# Patient Record
Sex: FEMALE | Race: WHITE
[De-identification: names, ages, dates, MRNs, and addresses within clinical notes are randomized per-mention and may not be internally consistent; named-entity substitution may affect disease eponyms.]

---

## 2017-11-12 ENCOUNTER — HOSPITAL ENCOUNTER (OUTPATIENT)
Dept: HOSPITAL 10 - OBT | Age: 22
LOS: 1 days | Discharge: HOME | End: 2017-11-13
Attending: OBSTETRICS & GYNECOLOGY
Payer: COMMERCIAL

## 2017-11-12 VITALS
BODY MASS INDEX: 31.94 KG/M2 | HEIGHT: 60 IN | BODY MASS INDEX: 31.94 KG/M2 | WEIGHT: 162.7 LBS | HEIGHT: 60 IN | WEIGHT: 162.7 LBS

## 2017-11-12 DIAGNOSIS — O26.892: Primary | ICD-10-CM

## 2017-11-12 DIAGNOSIS — Z3A.21: ICD-10-CM

## 2017-11-12 DIAGNOSIS — R19.7: ICD-10-CM

## 2017-11-12 DIAGNOSIS — R11.2: ICD-10-CM

## 2017-11-12 LAB
ADD UMIC: NO
ALBUMIN SERPL-MCNC: 3.3 G/DL (ref 3.3–4.9)
ALBUMIN/GLOB SERPL: 0.97 {RATIO}
ALP SERPL-CCNC: 116 IU/L (ref 42–121)
ALT SERPL-CCNC: 24 IU/L (ref 13–69)
ANION GAP SERPL CALC-SCNC: 13 MMOL/L (ref 8–16)
AST SERPL-CCNC: 14 IU/L (ref 15–46)
BACTERIA #/AREA URNS HPF: (no result) /HPF
BASOPHILS # BLD AUTO: 0 10^3/UL (ref 0–0.1)
BASOPHILS NFR BLD: 0.2 % (ref 0–2)
BILIRUB DIRECT SERPL-MCNC: 0 MG/DL (ref 0–0.2)
BILIRUB SERPL-MCNC: 0 MG/DL (ref 0.2–1.3)
BUN SERPL-MCNC: 10 MG/DL (ref 7–20)
CALCIUM SERPL-MCNC: 8.4 MG/DL (ref 8.4–10.2)
CHLORIDE SERPL-SCNC: 109 MMOL/L (ref 97–110)
CO2 SERPL-SCNC: 19 MMOL/L (ref 21–31)
COLOR UR: YELLOW
CREAT SERPL-MCNC: 0.51 MG/DL (ref 0.44–1)
EOSINOPHIL # BLD: 0.2 10^3/UL (ref 0–0.5)
EOSINOPHIL NFR BLD: 2 % (ref 0–7)
ERYTHROCYTE [DISTWIDTH] IN BLOOD BY AUTOMATED COUNT: 13.9 % (ref 11.5–14.5)
GLOBULIN SER-MCNC: 3.4 G/DL (ref 1.3–3.2)
GLUCOSE SERPL-MCNC: 96 MG/DL (ref 70–220)
GLUCOSE UR STRIP-MCNC: NEGATIVE MG/DL
HCT VFR BLD CALC: 34.9 % (ref 37–47)
HGB BLD-MCNC: 11.4 G/DL (ref 12–16)
KETONES UR STRIP.AUTO-MCNC: NEGATIVE MG/DL
LYMPHOCYTES # BLD AUTO: 3 10^3/UL (ref 0.8–2.9)
LYMPHOCYTES NFR BLD AUTO: 29 % (ref 15–51)
MCH RBC QN AUTO: 28.9 PG (ref 29–33)
MCHC RBC AUTO-ENTMCNC: 32.7 G/DL (ref 32–37)
MCV RBC AUTO: 88.4 FL (ref 82–101)
MONOCYTES # BLD: 0.6 10^3/UL (ref 0.3–0.9)
MONOCYTES NFR BLD: 5.2 % (ref 0–11)
NEUTROPHILS # BLD: 6.6 10^3/UL (ref 1.6–7.5)
NEUTROPHILS NFR BLD AUTO: 63.2 % (ref 39–77)
NITRITE UR QL STRIP.AUTO: NEGATIVE MG/DL
NRBC # BLD MANUAL: 0 10^3/UL (ref 0–0)
NRBC BLD AUTO-RTO: 0 /100WBC (ref 0–0)
PLATELET # BLD: 369 10^3/UL (ref 140–415)
PMV BLD AUTO: 9.8 FL (ref 7.4–10.4)
POTASSIUM SERPL-SCNC: 3.8 MMOL/L (ref 3.5–5.1)
PROT SERPL-MCNC: 6.7 G/DL (ref 6.1–8.1)
RBC # BLD AUTO: 3.95 10^6/UL (ref 4.2–5.4)
RBC # UR AUTO: NEGATIVE MG/DL
SODIUM SERPL-SCNC: 137 MMOL/L (ref 135–144)
SQUAMOUS #/AREA URNS HPF: (no result) /HPF
UR ASCORBIC ACID: NEGATIVE MG/DL
UR BILIRUBIN (DIP): NEGATIVE MG/DL
UR CLARITY: (no result)
UR PH (DIP): 5 (ref 5–9)
UR RBC: 1 /HPF (ref 0–5)
UR SPECIFIC GRAVITY (DIP): 1.01 (ref 1–1.03)
UR TOTAL PROTEIN (DIP): NEGATIVE MG/DL
UROBILINOGEN UR STRIP-ACNC: NEGATIVE MG/DL
WBC # BLD AUTO: 10.5 10^3/UL (ref 4.8–10.8)
WBC # UR STRIP: NEGATIVE LEU/UL

## 2017-11-12 PROCEDURE — 96375 TX/PRO/DX INJ NEW DRUG ADDON: CPT

## 2017-11-12 PROCEDURE — G0463 HOSPITAL OUTPT CLINIC VISIT: HCPCS

## 2017-11-12 PROCEDURE — 80053 COMPREHEN METABOLIC PANEL: CPT

## 2017-11-12 PROCEDURE — 36415 COLL VENOUS BLD VENIPUNCTURE: CPT

## 2017-11-12 PROCEDURE — 81003 URINALYSIS AUTO W/O SCOPE: CPT

## 2017-11-12 PROCEDURE — 96360 HYDRATION IV INFUSION INIT: CPT

## 2017-11-12 PROCEDURE — 81001 URINALYSIS AUTO W/SCOPE: CPT

## 2017-11-12 PROCEDURE — 85025 COMPLETE CBC W/AUTO DIFF WBC: CPT

## 2017-11-13 NOTE — PN
Triage Information


Date/Time


2017.


Reason for visit:  Nausea/vomiting/diarrhea


Weeks of Gestation


21w


/Para


3/2


Diabetes:  none


Hypertention:  none


Additional information


Pt had sx's for 3 hours prior to admission. Denies fevers/chills. Pt was in 

[facility] and just returned so has not had prenatal care yet.


POBHx:  x 1.  x 1.


PMHx: none.


PSHx:  x 1.





Objective


BP 96/59  T= 98.1


Fetal Heart Rate:  150's


Contractions:  None


Exam


Deferred.





Results/Medications


Result Diagram:  


17





Results 24 hrs





Laboratory Tests








Test


  17


21:05 17


21:50


 


Urine Color YELLOW   


 


Urine Clarity


  SLIGHTLY


CLOUDY  A 


 


 


Urine pH 5.0   


 


Urine Specific Gravity 1.010   


 


Urine Ketones NEGATIVE   


 


Urine Nitrite NEGATIVE   


 


Urine Bilirubin NEGATIVE   


 


Urine Urobilinogen NEGATIVE   


 


Urine Leukocyte Esterase NEGATIVE   


 


Urine Microscopic RBC 1   


 


Urine Microscopic WBC 3   


 


Urine Squamous Epithelial


Cells FEW  


  


 


 


Urine Bacteria FEW  A 


 


Urine Hemoglobin NEGATIVE   


 


Urine Glucose NEGATIVE   


 


Urine Total Protein NEGATIVE   


 


White Blood Count  10.5  #


 


Red Blood Count  3.95  L


 


Hemoglobin  11.4  L


 


Hematocrit  34.9  L


 


Mean Corpuscular Volume  88.4  


 


Mean Corpuscular Hemoglobin  28.9  L


 


Mean Corpuscular Hemoglobin


Concent 


  32.7  


 


 


Red Cell Distribution Width  13.9  


 


Platelet Count  369  


 


Mean Platelet Volume  9.8  


 


Neutrophils %  63.2  


 


Lymphocytes %  29.0  


 


Monocytes %  5.2  


 


Eosinophils %  2.0  


 


Basophils %  0.2  


 


Nucleated Red Blood Cells %  0.0  


 


Neutrophils #  6.6  


 


Lymphocytes #  3.0  H


 


Monocytes #  0.6  


 


Eosinophils #  0.2  


 


Basophils #  0.0  


 


Nucleated Red Blood Cells #  0.0  


 


Sodium Level  137  


 


Potassium Level  3.8  


 


Chloride Level  109  


 


Carbon Dioxide Level  19  L


 


Anion Gap  13  


 


Blood Urea Nitrogen  10  


 


Creatinine  0.51  


 


Glucose Level  96  


 


Calcium Level  8.4  


 


Total Bilirubin  0.0  L


 


Direct Bilirubin  0.00  


 


Indirect Bilirubin  0.0  


 


Aspartate Amino Transf


(AST/SGOT) 


  14  L


 


 


Alanine Aminotransferase


(ALT/SGPT) 


  24  


 


 


Alkaline Phosphatase  116  


 


Total Protein  6.7  


 


Albumin  3.3  


 


Globulin  3.40  H


 


Albumin/Globulin Ratio  0.97  








Medications





 Current Medications


Lactated Ringer's (Lr) 1,000 ml @  125 mls/hr Q8H IV  Last administered on t 22:08; Admin Dose 125 MLS/HR;  Start 17 at 21:34


Disposition:  Discharge


Assessment/Plan


A: IUP at 21 weeks.


GI distress.


P: After IV hydration and one dose of Zofran the pt had no vomiting or diarrhea 

the entire 3 hours that she was here and reports feeling much better. All labs 

were normal.


Pt encouraged to seek [facility] as soon as possible.


Pt d/c'ed home.











PEYTON FAIR MD 2017 00:02

## 2017-11-13 NOTE — TRIAGE
===================================

OB Triage

===================================

Datetime Report Generated by CPN: 11/13/2017 00:24

   

   

===========================

Datetime: 11/13/2017 00:02

===========================

   

 Stage of Pregnancy:  OB Triage

   

===========================

Datetime: 11/12/2017 23:55

===========================

   

   

===================================

Fetal Heart Rate

===================================

   

 FHR Baseline Rate:  150

   

===========================

Datetime: 11/12/2017 23:00

===========================

   

   

===================================

Labor Evaluation

===================================

   

 Frequency:  0

 Duration (sec)2399:  0

   

===========================

Datetime: 11/12/2017 22:32

===========================

   

   

===================================

Pain Assessment

===================================

   

 Pain Scale:  0

 Pain Assessment Comments:  PT. STATES SHE IS NO LONGER HAVING CRAMPING PAIN OR URGE FOR DIARRHEA, N
AUSEA OR VOMITING

   

===========================

Datetime: 11/12/2017 21:43

===========================

   

   

===================================

Fetal Heart Rate

===================================

   

 FHR Baseline Rate:  155

 FHR Baseline Changes:  No Baseline Change

 Comments:  FHT OBTAINED ONLY D/T GA

   

===========================

Datetime: 11/12/2017 21:40

===========================

   

 Monitor Mode:  External

 Contraction Comments:  TOCO PLACED SLIGHTLY BELOW UMBILICUS

 Monitor Mode:  External US

   

===========================

Datetime: 11/12/2017 21:29

===========================

   

 Stage of Pregnancy:  OB Triage

 Assessment Type:  Triage

 Time of Arrival:  11/12/2017 21:11

 EGA:  21.3

 Arrived By:  Wheelchair

 Arrived From:  Home

 Chief Complaint:  N/V AND DIARRHEA X5

 Rupture of Membranes:  Denies

 Vaginal Bleeding:  None

 Vaginal Discharge:  Denies

 Recent Sexual Intercouse:  Denies

 Abdominal Trauma:  Not Applicable

 Patient Complaints:  Cramping; Nausea; Vomiting; Other

 Time Provider Notified:  11/12/2017 21:27

 Provider Notified:  REICHE

 Initial Plan:  DOPPLER, CALL OB

   

===================================

Maternal Assessment

===================================

   

 Level of Consciousness:  Fully Conscious

 Headache:  Denies

 Blurred Vision:  No

 Respiratory Effort:  Unlabored; Regular Rhythm; Equal Expansion

 Nausea/Vomiting:  Denies

 RUQ Epigastric Pain:  Denies

 Facial Edema:  None

   

===================================

Fall Risk Assessment

===================================

   

 History of Falling:  (0) No

 Secondary Diagnosis:  (0) No

 Ambulatory Aid:  (0) Bedrest/Nurse Assist

 IV Therapy:  (0) No

 Gait:  (0) Normal/Bedrest/Immobile

 Mental Status:  (0) Oriented to Own Ability

 Fall Score:  0

 Fall Risk Score Definition:  No Risk: No action required

   

===========================

Datetime: 11/12/2017 21:27

===========================

   

 Stage of Pregnancy:  OB Triage

## 2017-12-07 ENCOUNTER — HOSPITAL ENCOUNTER (OUTPATIENT)
Dept: HOSPITAL 10 - OBT | Age: 22
Discharge: HOME | End: 2017-12-07
Attending: OBSTETRICS & GYNECOLOGY
Payer: COMMERCIAL

## 2017-12-07 VITALS — DIASTOLIC BLOOD PRESSURE: 55 MMHG | RESPIRATION RATE: 18 BRPM | HEART RATE: 78 BPM | SYSTOLIC BLOOD PRESSURE: 96 MMHG

## 2017-12-07 VITALS
BODY MASS INDEX: 30.91 KG/M2 | HEIGHT: 62 IN | HEIGHT: 62 IN | BODY MASS INDEX: 30.91 KG/M2 | WEIGHT: 167.99 LBS | WEIGHT: 167.99 LBS

## 2017-12-07 DIAGNOSIS — Z3A.31: ICD-10-CM

## 2017-12-07 LAB
ADD UMIC: NO
BACTERIA #/AREA URNS HPF: (no result) /HPF
BASOPHILS # BLD AUTO: 0 10^3/UL (ref 0–0.1)
BASOPHILS NFR BLD: 0.4 % (ref 0–2)
COLOR UR: YELLOW
EOSINOPHIL # BLD: 0.1 10^3/UL (ref 0–0.5)
EOSINOPHIL NFR BLD: 0.6 % (ref 0–7)
ERYTHROCYTE [DISTWIDTH] IN BLOOD BY AUTOMATED COUNT: 13.4 % (ref 11.5–14.5)
GLUCOSE UR STRIP-MCNC: NEGATIVE MG/DL
HCT VFR BLD CALC: 34.3 % (ref 37–47)
HGB BLD-MCNC: 11.3 G/DL (ref 12–16)
KETONES UR STRIP.AUTO-MCNC: NEGATIVE MG/DL
LYMPHOCYTES # BLD AUTO: 2.6 10^3/UL (ref 0.8–2.9)
LYMPHOCYTES NFR BLD AUTO: 28.1 % (ref 15–51)
MCH RBC QN AUTO: 28.7 PG (ref 29–33)
MCHC RBC AUTO-ENTMCNC: 32.9 G/DL (ref 32–37)
MCV RBC AUTO: 87.1 FL (ref 82–101)
MONOCYTES # BLD: 0.4 10^3/UL (ref 0.3–0.9)
MONOCYTES NFR BLD: 4.2 % (ref 0–11)
NEUTROPHILS # BLD: 6.2 10^3/UL (ref 1.6–7.5)
NEUTROPHILS NFR BLD AUTO: 66.4 % (ref 39–77)
NITRITE UR QL STRIP.AUTO: NEGATIVE MG/DL
NRBC # BLD MANUAL: 0 10^3/UL (ref 0–0)
NRBC BLD AUTO-RTO: 0 /100WBC (ref 0–0)
PLATELET # BLD: 384 10^3/UL (ref 140–415)
PMV BLD AUTO: 9.6 FL (ref 7.4–10.4)
RBC # BLD AUTO: 3.94 10^6/UL (ref 4.2–5.4)
RBC # UR AUTO: NEGATIVE MG/DL
SQUAMOUS #/AREA URNS HPF: (no result) /HPF
UR ASCORBIC ACID: NEGATIVE MG/DL
UR BILIRUBIN (DIP): NEGATIVE MG/DL
UR CLARITY: (no result)
UR PH (DIP): 6 (ref 5–9)
UR RBC: 1 /HPF (ref 0–5)
UR SPECIFIC GRAVITY (DIP): 1.02 (ref 1–1.03)
UR TOTAL PROTEIN (DIP): NEGATIVE MG/DL
UROBILINOGEN UR STRIP-ACNC: NEGATIVE MG/DL
WBC # BLD AUTO: 9.3 10^3/UL (ref 4.8–10.8)
WBC # UR STRIP: NEGATIVE LEU/UL

## 2017-12-07 PROCEDURE — 85025 COMPLETE CBC W/AUTO DIFF WBC: CPT

## 2017-12-07 PROCEDURE — 76818 FETAL BIOPHYS PROFILE W/NST: CPT

## 2017-12-07 PROCEDURE — 96372 THER/PROPH/DIAG INJ SC/IM: CPT

## 2017-12-07 PROCEDURE — 76705 ECHO EXAM OF ABDOMEN: CPT

## 2017-12-07 PROCEDURE — 81001 URINALYSIS AUTO W/SCOPE: CPT

## 2017-12-07 PROCEDURE — 81003 URINALYSIS AUTO W/O SCOPE: CPT

## 2017-12-07 PROCEDURE — 76817 TRANSVAGINAL US OBSTETRIC: CPT

## 2017-12-07 PROCEDURE — G0463 HOSPITAL OUTPT CLINIC VISIT: HCPCS

## 2017-12-07 NOTE — RADRPT
PROCEDURE:   OB ultrasound for biophysical profile 

 

CLINICAL INDICATION:   Biophysical profile. 

 

TECHNIQUE:   Multiple sonographic images of the pelvis were obtained.  Transabdominal view of the gr
avid uterus are available for review.  The images were reviewed on a PACS workstation.  

 

COMPARISON:   None 

 

FINDINGS:

 

Fetal breathing movement = 2/2

Fetal tone = 2/2

Fetal motion = 2/2

Quantitative amniotic fluid volume = 2/2

 

FANY = 14.0 cm

Single live intrauterine pregnancy with fetal cardiac activity at 150 beats per minute.  

There is a fundal placenta without previa or abruption.

Cervix is closed and measures 4.6 cm in length.

 

IMPRESSION:

 

1.  Single living intrauterine gestation in breech position.  

2.  Biophysical profile = 8/8.

3.  FANY = 14.0 cm. 

 

 

RPTAT: AACC

_____________________________________________ 

Physician Isela           Date    Time 

Electronically viewed and signed by Physician Isela on 12/07/2017 17:07 

 

D:  12/07/2017 17:07  T:  12/07/2017 17:07

/

## 2017-12-08 NOTE — RADRPT
PROCEDURE:   US Abdomen limited. 

 

CLINICAL INDICATION:    Abdominal pain  

 

TECHNIQUE:   Multiple real-time images were acquired of the patient's right lower quadrant utilizing
 a high resolution transducer. 

 

COMPARISON:   None 

 

FINDINGS:

The appendix is not visualized.  

No free fluid is identified. No pain was elicited with compression or release of the ultrasound prob
e.

 

 

IMPRESSION:

 

No ultrasound evidence of appendicitis.

If there is a high clinical suspicion for appendicitis, cross-sectional imaging is recommended.

 

 

RPTAT: HJES

_____________________________________________ 

.Roel Rush MD, MD           Date    Time 

Electronically viewed and signed by .Roel Rush MD, MD on 12/07/2017 18:38 

 

D:  12/07/2017 18:38  T:  12/07/2017 18:38

.S/

## 2017-12-08 NOTE — TRIAGE
===================================

OB Triage

===================================

Datetime Report Generated by CPN: 12/08/2017 05:38

   

   

===========================

Datetime: 12/07/2017 18:12

===========================

   

   

===================================

Labor Evaluation

===================================

   

 Frequency:  0

 Monitor Mode:  External

 Resting Tone [place]:  Relaxed

   

===================================

Fetal Heart Rate

===================================

   

 FHR Baseline Rate:  155

 Monitor Mode:  External US

 Variability:  Moderate 6-25 bpm

 Decelerations:  None

 Category:  Category I

   

===================================

Pain Assessment

===================================

   

 Pain Scale:  4

 Pain Presence:  Intermittent

 Pain Type:  Cramping

 Pain Location:  Perineum

 Pain Goal:  3

 Pain Relief Measures:  Comfort Measures

   

===========================

Datetime: 12/07/2017 18:04

===========================

   

 Stage of Pregnancy:  OB Triage

   

===========================

Datetime: 12/07/2017 17:11

===========================

   

   

===================================

Labor Evaluation

===================================

   

 Frequency:  0

 Monitor Mode:  External

 Pattern:  Normal: <= 5 Contractions in 10 Minutes

 Resting Tone [place]:  Relaxed

   

===================================

Fetal Heart Rate

===================================

   

 FHR Baseline Rate:  155

 Monitor Mode:  External US

 Variability:  Moderate 6-25 bpm

 Accelerations:  10X10

 Decelerations:  None

 Category:  Category I

   

===================================

Pain Assessment

===================================

   

 Pain Scale:  4

 Pain Presence:  Intermittent

 Pain Type:  Cramping

 Pain Location:  Perineum

 Pain Goal:  3

 Pain Relief Measures:  Comfort Measures

   

===========================

Datetime: 12/07/2017 16:15

===========================

   

 Stage of Pregnancy:  OB Triage

   

===========================

Datetime: 12/07/2017 16:11

===========================

   

   

===================================

Labor Evaluation

===================================

   

 Frequency:  0

 Monitor Mode:  External

 Resting Tone [place]:  Relaxed

   

===================================

Fetal Heart Rate

===================================

   

 FHR Baseline Rate:  155

 Monitor Mode:  External US

 Variability:  Moderate 6-25 bpm

 Accelerations:  10X10

 Decelerations:  None

 Category:  Category I

   

===================================

Pain Assessment

===================================

   

 Pain Scale:  4

 Pain Presence:  Intermittent

 Pain Type:  Cramping

 Pain Location:  Perineum

 Pain Goal:  3

 Pain Relief Measures:  Comfort Measures

   

===========================

Datetime: 12/07/2017 15:55

===========================

   

 Stage of Pregnancy:  OB Triage

   

===========================

Datetime: 12/07/2017 15:09

===========================

   

 Stage of Pregnancy:  OB Triage

 Assessment Type:  Triage

   

===================================

Maternal Assessment

===================================

   

 Level of Consciousness:  Fully Conscious

 DTR's/Clonus:  DTRs 2+; No Clonus

 Headache:  Denies

 Blurred Vision:  No

 Respiratory Effort:  Unlabored; Regular Rhythm; Equal Expansion

 Breath Sounds, Left:  Clear and Equal

 Breath Sounds, Right:  Clear and Equal

 Nausea/Vomiting:  Denies

 RUQ Epigastric Pain:  Denies

 Facial Edema:  None

 Temperature Route:  Axillary

   

===================================

Fall Risk Assessment

===================================

   

 History of Falling:  (0) No

 Secondary Diagnosis:  (0) No

 Ambulatory Aid:  (0) Bedrest/Nurse Assist

 IV Therapy:  (0) No

 Gait:  (0) Normal/Bedrest/Immobile

 Mental Status:  (0) Oriented to Own Ability

 Fall Score:  0

 Fall Risk Score Definition:  No Risk: No action required

   

===================================

Labor Evaluation

===================================

   

 Frequency:  0

 Monitor Mode:  External

 Quality:  Mild

 Pattern:  Normal: <= 5 Contractions in 10 Minutes

 Resting Tone [place]:  Relaxed

   

===================================

Fetal Heart Rate

===================================

   

 FHR Baseline Rate:  145

 Monitor Mode:  External US

 Variability:  Moderate 6-25 bpm

 Accelerations:  10X10

 Decelerations:  None

 Category:  Category I

   

===================================

Pain Assessment

===================================

   

 Pain Scale:  4

 Pain Presence:  Intermittent

 Pain Type:  Cramping

 Pain Location:  Abdomen

 Pain Goal:  3

 Pain Relief Measures:  Comfort Measures

   

===========================

Datetime: 12/07/2017 15:05

===========================

   

 Time of Arrival:  12/07/2017 14:25

 EGA:  31.0

 Arrived By:  Ambulatory

 Arrived From:  Home

 Chief Complaint:  C/O UC'S THAT STARTED YESTERDAY AT 1043 THAT OCCUR APPROX EVERY HOUR. DENIES BLEE
DING OR LEAKING

 Fetal Movement:  Present

 Contractions:  Irregular

 Contractions:  40-60

 Rupture of Membranes:  Denies

 Vaginal Bleeding:  None

 Vaginal Discharge:  Denies

 Recent Sexual Intercouse:  Denies

 Abdominal Trauma:  Not Applicable

 Patient Complaints:  Cramping

 Time Provider Notified:  12/07/2017 14:25

 Initial Plan:  MONITOR, U/A, CL, BPP

   

===========================

Datetime: 11/12/2017 21:29

===========================

   

 EGA:  27.3

 Fall Score:  0

 Fall Risk Score Definition:  No Risk: No action required

## 2017-12-08 NOTE — PN
Triage Information


Date/Time


17 9359


Reason for visit:  Uterine contractions


Weeks of Gestation


31w1d


/Para





Diabetes:  none


Hypertention:  none


Additional information


x1 


X1 c/s





Objective





 Vital Signs








  Date Time  Temp Pulse Resp B/P Pulse Ox O2 Delivery O2 Flow Rate FiO2


 


17 15:18 98.3 78 18 96/55    











Results/Medications


Result Diagram:  


17 1908





Results 24 hrs





Laboratory Tests








Test


  17


15:20 17


19:05


 


Urine Color YELLOW   


 


Urine Clarity


  SLIGHTLY


CLOUDY  A 


 


 


Urine pH 6.0   


 


Urine Specific Gravity 1.017   


 


Urine Ketones NEGATIVE   


 


Urine Nitrite NEGATIVE   


 


Urine Bilirubin NEGATIVE   


 


Urine Urobilinogen NEGATIVE   


 


Urine Leukocyte Esterase NEGATIVE   


 


Urine Microscopic RBC 1   


 


Urine Microscopic WBC 1   


 


Urine Squamous Epithelial


Cells FEW  


  


 


 


Urine Bacteria FEW  A 


 


Urine Hemoglobin NEGATIVE   


 


Urine Glucose NEGATIVE   


 


Urine Total Protein NEGATIVE   


 


White Blood Count  9.3  


 


Red Blood Count  3.94  L


 


Hemoglobin  11.3  L


 


Hematocrit  34.3  L


 


Mean Corpuscular Volume  87.1  


 


Mean Corpuscular Hemoglobin  28.7  L


 


Mean Corpuscular Hemoglobin


Concent 


  32.9  


 


 


Red Cell Distribution Width  13.4  


 


Platelet Count  384  


 


Mean Platelet Volume  9.6  


 


Neutrophils %  66.4  


 


Lymphocytes %  28.1  


 


Monocytes %  4.2  


 


Eosinophils %  0.6  


 


Basophils %  0.4  


 


Nucleated Red Blood Cells %  0.0  


 


Neutrophils #  6.2  


 


Lymphocytes #  2.6  


 


Monocytes #  0.4  


 


Eosinophils #  0.1  


 


Basophils #  0.0  


 


Nucleated Red Blood Cells #  0.0  








Medications


terbtaline


Imaging Results


bpp8/8


cvl 4.6


micah 14


Disposition:  Discharge


Assessment/Plan


IUP 31w1d


uc's  resolved with oral fluid and restx1 terbutaline





plan  d/s home   f/u with [facility]











LUISA NAVARRO MD Dec 8, 2017 00:10

## 2017-12-12 ENCOUNTER — HOSPITAL ENCOUNTER (OUTPATIENT)
Dept: HOSPITAL 10 - L-D | Age: 22
Discharge: HOME | End: 2017-12-12
Attending: OBSTETRICS & GYNECOLOGY
Payer: COMMERCIAL

## 2017-12-12 VITALS — SYSTOLIC BLOOD PRESSURE: 102 MMHG | RESPIRATION RATE: 20 BRPM | HEART RATE: 84 BPM | DIASTOLIC BLOOD PRESSURE: 58 MMHG

## 2017-12-12 VITALS
BODY MASS INDEX: 30.55 KG/M2 | BODY MASS INDEX: 30.55 KG/M2 | HEIGHT: 62 IN | WEIGHT: 166.01 LBS | HEIGHT: 62 IN | WEIGHT: 166.01 LBS

## 2017-12-12 DIAGNOSIS — Z3A.31: ICD-10-CM

## 2017-12-12 DIAGNOSIS — O42.90: Primary | ICD-10-CM

## 2017-12-12 LAB
ADD UMIC: NO
COLOR UR: YELLOW
GLUCOSE UR STRIP-MCNC: NEGATIVE MG/DL
KETONES UR STRIP.AUTO-MCNC: NEGATIVE MG/DL
NITRITE UR QL STRIP.AUTO: NEGATIVE MG/DL
RBC # UR AUTO: NEGATIVE MG/DL
UR ASCORBIC ACID: NEGATIVE MG/DL
UR BILIRUBIN (DIP): NEGATIVE MG/DL
UR CLARITY: CLEAR
UR PH (DIP): 5 (ref 5–9)
UR SPECIFIC GRAVITY (DIP): 1.02 (ref 1–1.03)
UR TOTAL PROTEIN (DIP): NEGATIVE MG/DL
UROBILINOGEN UR STRIP-ACNC: NEGATIVE MG/DL
WBC # UR STRIP: NEGATIVE LEU/UL

## 2017-12-12 PROCEDURE — 76817 TRANSVAGINAL US OBSTETRIC: CPT

## 2017-12-12 PROCEDURE — G0463 HOSPITAL OUTPT CLINIC VISIT: HCPCS

## 2017-12-12 PROCEDURE — 84112 EVAL AMNIOTIC FLUID PROTEIN: CPT

## 2017-12-12 PROCEDURE — 76815 OB US LIMITED FETUS(S): CPT

## 2017-12-12 PROCEDURE — 76818 FETAL BIOPHYS PROFILE W/NST: CPT

## 2017-12-12 PROCEDURE — 81003 URINALYSIS AUTO W/O SCOPE: CPT

## 2017-12-12 NOTE — RADRPT
PROCEDURE:   US cervix

 

CLINICAL INDICATION:  labor

 

TECHNIQUE:   Limited OB ultrasound was performed to evaluate the cervix 

 

COMPARISON:   No prior studies are available for comparison. 

 

FINDINGS:

 

The cervix is closed and measures 4.43 cm. No funneling or dilatation is seen

 

IMPRESSION:

 

Cervix is closed and measures 4.43 cm in length

 

 

RPTAT: HH

_____________________________________________ 

.Esau Pike MD, MD           Date    Time 

Electronically viewed and signed by .Esau Pike MD, MD on 2017 16:32 

 

D:  2017 16:32  T:  2017 16:32

.W/

## 2017-12-12 NOTE — RADRPT
PROCEDURE:   US OB. 

 

CLINICAL INDICATION:   Leaking  

 

TECHNIQUE:   Multiple sonographic images of the pelvis were obtained.  The images were reviewed on a
 PACS workstation. 

 

COMPARISON:   12/07/2007 

 

FINDINGS:

 

There is a single viable intrauterine gestation.  Cardiac activity is present with 154 beats per min
isma. 

There is a breech presentation. 

 

Measurements were made in order to determine fetal age. The results are as follows:

BPD =6.51 cm

HC =24.32 cm

AC =21.37 cm

FL =5.20 cm.

Estimated gestational age of approximately 26 weeks 4 days.  

The estimated date of delivery is 03/16/2018.  

The EFW = 960.29 g < 3% .  

The placenta is posterior fundal grade 1. There is no evidence for an abruption or placenta previa.

There is a normal amount of amniotic fluid 

 

IMPRESSION:

Single viable intrauterine gestation of approximately 26 weeks 4 days.  The estimated date of delive
ry is 03/16/2018

Breech fetal position .

_____________________________________________ 

.Esau Pike MD, MD           Date    Time 

Electronically viewed and signed by .Esau Pike MD, MD on 12/12/2017 15:23 

 

D:  12/12/2017 15:23  T:  12/12/2017 15:23

.W/

## 2017-12-12 NOTE — PN
Triage Information


Date/Time





Reason for visit:  SROM


Weeks of Gestation


31+


/Para


3/2


Diabetes:  none





Objective





 Vital Signs








  Date Time  Temp Pulse Resp B/P Pulse Ox O2 Delivery O2 Flow Rate FiO2


 


17 14:10 98.4 84 20 102/58    








Fetal Heart Rate:  140's


Contractions:  None





Results/Medications


Results 24 hrs





Laboratory Tests








Test


  17


14:35


 


Urine Color YELLOW  


 


Urine Clarity CLEAR  


 


Urine pH 5.0  


 


Urine Specific Gravity 1.021  


 


Urine Ketones NEGATIVE  


 


Urine Nitrite NEGATIVE  


 


Urine Bilirubin NEGATIVE  


 


Urine Urobilinogen NEGATIVE  


 


Urine Leukocyte Esterase NEGATIVE  


 


Urine Hemoglobin NEGATIVE  


 


Urine Glucose NEGATIVE  


 


Urine Total Protein NEGATIVE  


 


Fetal Membranes Rupture NEGATIVE  








Disposition:  Discharge


Assessment/Plan


ROM test neg


CXL >4cm


FANY WNL


--->discharged with precautions











MAUREEN FREEDMAN M.D. Dec 12, 2017 16:44

## 2017-12-12 NOTE — RADRPT
PROCEDURE:   Obstetrical ultrasound for biophysical profile 

 

CLINICAL INDICATION:  Biophysical profile.  Pregnancy.  

 

TECHNIQUE:   Obstetrical ultrasound of the pregnant uterus for biophysical profile.  Transabdominal 
views are obtained.

 

COMPARISON:   US PELVIS 12/7/2017

 

FINDINGS:

Single intrauterine gestation.

 

Fetal breathing movement = 2/2

Fetal tone = 2/2

Fetal motion = 2/2

FANY = 2/2

 

FANY = 11.6 cm

Fetal heart rate: 154 beats per minute

 

IMPRESSION:

 

Single intrauterine gestation.

Biophysical profile  8/8  

 

RPTAT: AADD

_____________________________________________ 

.Ruben Josue MD, MD           Date    Time 

Electronically viewed and signed by .Ruben Josue MD, MD on 12/12/2017 15:18 

 

D:  12/12/2017 15:18  T:  12/12/2017 15:18

.B/

## 2017-12-12 NOTE — TRIAGE
===================================

OB Triage

===================================

Datetime Report Generated by CPN: 12/12/2017 17:05

   

   

===========================

Datetime: 12/12/2017 15:47

===========================

   

 Stage of Pregnancy:  OB Triage

   

===================================

Labor Evaluation

===================================

   

 Frequency:  NONE

 Monitor Mode:  External

 Resting Tone [place]:  Relaxed

   

===================================

Fetal Heart Rate

===================================

   

 FHR Baseline Rate:  150

 FHR Baseline Changes:  No Baseline Change

 Variability:  Moderate 6-25 bpm

 Accelerations:  15X15

 Decelerations:  None

 Category:  Category I

   

===================================

Pain Assessment

===================================

   

 Pain Scale:  0

 Pain Presence:  None/Denies

 Pain Goal:  3

   

===========================

Datetime: 12/12/2017 15:00

===========================

   

 Assessment Type:  Triage

   

===================================

Maternal Assessment

===================================

   

 Level of Consciousness:  Fully Conscious

 DTR's/Clonus:  DTRs 2+; No Clonus

 Headache:  Denies

 Blurred Vision:  No

 Respiratory Effort:  Unlabored; Regular Rhythm; Equal Expansion

 Breath Sounds, Left:  Clear and Equal

 Breath Sounds, Right:  Clear and Equal

 Nausea/Vomiting:  Denies

 RUQ Epigastric Pain:  Denies

 Lower Extremities Edema:  None

     Degree:  None

 Upper Extremities Edema:  None

     Degree:  None

 Facial Edema:  None

   

===================================

Fall Risk Assessment

===================================

   

 History of Falling:  (0) No

 Secondary Diagnosis:  (0) No

 Ambulatory Aid:  (0) Bedrest/Nurse Assist

 IV Therapy:  (0) No

 Gait:  (0) Normal/Bedrest/Immobile

 Mental Status:  (0) Oriented to Own Ability

 Fall Score:  0

 Fall Risk Score Definition:  No Risk: No action required

   

===========================

Datetime: 12/12/2017 14:45

===========================

   

 Stage of Pregnancy:  Antepartum

   

===================================

Labor Evaluation

===================================

   

 Frequency:  NONE

 Monitor Mode:  External

 Resting Tone [place]:  Relaxed

   

===================================

Fetal Heart Rate

===================================

   

 FHR Baseline Rate:  150

 FHR Baseline Changes:  No Baseline Change

 Variability:  Moderate 6-25 bpm

 Accelerations:  10X10

 Decelerations:  None

 Category:  Category I

   

===================================

Pain Assessment

===================================

   

 Pain Scale:  0

 Pain Presence:  None/Denies

 Pain Goal:  3

   

===========================

Datetime: 12/12/2017 14:18

===========================

   

 Time of Arrival:  12/12/2017 13:20

 EGA:  31.5

 Arrived By:  Ambulatory

 Arrived From:   Office

 Chief Complaint:  LEAKING

 Fetal Movement:  Present

 Contractions:  Irregular

 Time Contractions Began:  12/12/2017 11:00

 Rupture of Membranes:  Ruptured

 Vaginal Bleeding:  None

 Vaginal Discharge:  Present

 Recent Sexual Intercouse:  Denies

 Abdominal Trauma:  Not Applicable

 Patient Complaints:  Contractions

 Initial Plan:  NST (Annotations: Data stored by CPN on behalf of user)

   

===========================

Datetime: 12/07/2017 22:20

===========================

   

 Stage of Pregnancy:  OB Triage

   

===================================

Fetal Heart Rate

===================================

   

 FHR Baseline Rate:  150

 Monitor Mode:  External US

 FHR Baseline Changes:  No Baseline Change

 Variability:  Moderate 6-25 bpm

 Accelerations:  15X15

 Decelerations:  None

 Category:  Category I

   

===================================

Pain Assessment

===================================

   

 Pain Scale:  2

 Pain Presence:  Intermittent

 Pain Type:  Cramping

 Pain Location:  Abdomen

   

===========================

Datetime: 12/07/2017 20:35

===========================

   

 Stage of Pregnancy:  OB Triage

 Monitor Mode:  External

 Quality:  Mild

 Pattern:  Normal: <= 5 Contractions in 10 Minutes

 Resting Tone [place]:  Relaxed

   

===================================

Fetal Heart Rate

===================================

   

 FHR Baseline Rate:  150

 Monitor Mode:  External US

 Variability:  Moderate 6-25 bpm

 Accelerations:  15X15

 Decelerations:  None

 Category:  Category I

   

===================================

Pain Assessment

===================================

   

 Pain Scale:  7

 Pain Presence:  Intermittent

 Pain Type:  Contraction

 Pain Location:  Abdomen

   

===========================

Datetime: 12/07/2017 19:28

===========================

   

 Stage of Pregnancy:  OB Triage

 Monitor Mode:  External

 Quality:  Mild

 Pattern:  Normal: <= 5 Contractions in 10 Minutes

 Resting Tone [place]:  Relaxed

   

===================================

Fetal Heart Rate

===================================

   

 FHR Baseline Rate:  150

 Monitor Mode:  External US

 Variability:  Moderate 6-25 bpm

 Accelerations:  15X15

 Decelerations:  None

 Category:  Category I

   

===================================

Pain Assessment

===================================

   

 Pain Scale:  7

 Pain Presence:  Intermittent

 Pain Type:  Contraction

 Pain Location:  Abdomen

   

===========================

Datetime: 12/07/2017 15:09

===========================

   

 Fall Score:  0

 Fall Risk Score Definition:  No Risk: No action required

   

===========================

Datetime: 12/07/2017 15:05

===========================

   

 EGA:  31.0

   

===========================

Datetime: 11/12/2017 21:29

===========================

   

 EGA:  27.3

 Fall Score:  0

 Fall Risk Score Definition:  No Risk: No action required

## 2017-12-14 ENCOUNTER — HOSPITAL ENCOUNTER (OUTPATIENT)
Dept: HOSPITAL 10 - OBT | Age: 22
Discharge: HOME | End: 2017-12-14
Attending: OBSTETRICS & GYNECOLOGY
Payer: COMMERCIAL

## 2017-12-14 VITALS — SYSTOLIC BLOOD PRESSURE: 102 MMHG | DIASTOLIC BLOOD PRESSURE: 64 MMHG | HEART RATE: 101 BPM | RESPIRATION RATE: 18 BRPM

## 2017-12-14 VITALS
BODY MASS INDEX: 31.08 KG/M2 | HEIGHT: 62 IN | WEIGHT: 168.87 LBS | WEIGHT: 168.87 LBS | HEIGHT: 62 IN | BODY MASS INDEX: 31.08 KG/M2

## 2017-12-14 DIAGNOSIS — Z3A.26: ICD-10-CM

## 2017-12-14 PROCEDURE — G0463 HOSPITAL OUTPT CLINIC VISIT: HCPCS

## 2017-12-14 PROCEDURE — 76818 FETAL BIOPHYS PROFILE W/NST: CPT

## 2017-12-14 NOTE — TRIAGE
===================================

OB Triage

===================================

Datetime Report Generated by CPN: 12/14/2017 17:24

   

   

===========================

Datetime: 12/14/2017 15:55

===========================

   

 Stage of Pregnancy:  OB Triage

   

===========================

Datetime: 12/14/2017 15:34

===========================

   

   

===================================

Labor Evaluation

===================================

   

 Frequency:  0

 Monitor Mode:  External

 Resting Tone [place]:  Relaxed

   

===================================

Fetal Heart Rate

===================================

   

 FHR Baseline Rate:  145

 Monitor Mode:  External US

 Variability:  Moderate 6-25 bpm

 Accelerations:  10X10

 Decelerations:  None

 Category:  Category I

   

===================================

Pain Assessment

===================================

   

 Pain Scale:  0

 Pain Presence:  None/Denies

 Pain Type:  N/A

 Pain Goal:  3

 Pain Relief Measures:  Comfort Measures

   

===========================

Datetime: 12/14/2017 14:50

===========================

   

 Stage of Pregnancy:  Labor

   

===================================

Labor Evaluation

===================================

   

 Frequency:  none

 Pattern:  Normal: <= 5 Contractions in 10 Minutes

   

===================================

Fetal Heart Rate

===================================

   

 FHR Baseline Rate:  150

 Monitor Mode:  External US

 FHR Baseline Changes:  No Baseline Change

 Variability:  Moderate 6-25 bpm

 Accelerations:  15X15

 Decelerations:  None

 Category:  Category I

 Pain Presence:  None/Denies

   

===================================

Vaginal Exam

===================================

   

 Membrane Status:  Intact

   

===========================

Datetime: 12/14/2017 14:30

===========================

   

 Stage of Pregnancy:  OB Triage

   

===================================

Labor Evaluation

===================================

   

 Frequency:  none

 Pattern:  Normal: <= 5 Contractions in 10 Minutes

   

===================================

Fetal Heart Rate

===================================

   

 FHR Baseline Rate:  155

 Monitor Mode:  External US

 FHR Baseline Changes:  No Baseline Change

 Variability:  Moderate 6-25 bpm

 Accelerations:  10X10

 Decelerations:  None

 Category:  Category I

   

===========================

Datetime: 12/14/2017 13:33

===========================

   

 Stage of Pregnancy:  OB Triage

   

===================================

Maternal Assessment

===================================

   

 Level of Consciousness:  Fully Conscious

 DTR's/Clonus:  DTRs 2+; No Clonus

 Headache:  Denies

 Blurred Vision:  No

 Respiratory Effort:  Unlabored; Regular Rhythm; Equal Expansion

 Breath Sounds, Left:  Clear and Equal

 Breath Sounds, Right:  Clear and Equal

 Nausea/Vomiting:  Denies

 RUQ Epigastric Pain:  Denies

 Lower Extremities Edema:  None

     Degree:  None

 Upper Extremities Edema:  None

     Degree:  None

 Facial Edema:  None

 Temperature Route:  Oral

   

===================================

Fall Risk Assessment

===================================

   

 History of Falling:  (0) No

 Secondary Diagnosis:  (0) No

 Ambulatory Aid:  (0) Bedrest/Nurse Assist

 IV Therapy:  (0) No

 Gait:  (0) Normal/Bedrest/Immobile

 Mental Status:  (0) Oriented to Own Ability

 Fall Score:  0

 Fall Risk Score Definition:  No Risk: No action required

 Monitor Mode:  External

   

===================================

Fetal Heart Rate

===================================

   

 FHR Baseline Rate:  150

 Monitor Mode:  External US

 FHR Baseline Changes:  initial

   

===================================

Pain Assessment

===================================

   

 Pain Scale:  0

   

===========================

Datetime: 12/14/2017 13:31

===========================

   

 Time of Arrival:  12/14/2017 13:31

 EGA:  26.6

 Arrived By:  Ambulatory

 Arrived From:  Home

 Chief Complaint:  fllow up fro monitoring and u/s

 Fetal Movement:  Present

 Contractions:  Denies/Absent

 Rupture of Membranes:  Denies

 Vaginal Bleeding:  None

 Vaginal Discharge:  Denies

 Recent Sexual Intercouse:  Denies

 Abdominal Trauma:  Not Applicable

 Patient Complaints:  Other

 Time Provider Notified:  12/14/2017 13:56

 Provider Notified:  DR Gallegos

 Initial Plan:  efm/ u/s, BPP 

   

===========================

Datetime: 12/14/2017 13:16

===========================

   

 EGA:  26.4

   

===========================

Datetime: 12/14/2017 13:15

===========================

   

 EGA:  26.4

   

===========================

Datetime: 12/12/2017 17:02

===========================

   

 Time Provider Notified:  12/12/2017 14:00

   

===========================

Datetime: 12/12/2017 15:00

===========================

   

 Fall Score:  0

 Fall Risk Score Definition:  No Risk: No action required

   

===========================

Datetime: 12/12/2017 14:18

===========================

   

 EGA:  31.5

   

===========================

Datetime: 12/07/2017 15:09

===========================

   

 Fall Score:  0

 Fall Risk Score Definition:  No Risk: No action required

   

===========================

Datetime: 12/07/2017 15:05

===========================

   

 EGA:  31.0

   

===========================

Datetime: 11/12/2017 21:29

===========================

   

 EGA:  27.3

 Fall Score:  0

 Fall Risk Score Definition:  No Risk: No action required

## 2017-12-14 NOTE — RADRPT
PROCEDURE:   US OB biophysical profile. 

 

CLINICAL INDICATION:  Contractions

 

TECHNIQUE:   Multiple sonographic images of the pelvis were obtained.  The images were reviewed on a
 PACS workstation. 

 

COMPARISON:   None 

 

FINDINGS:

 

There is a single live intrauterine pregnancy, in breech presentation. A normal fetal heart rate is 
identified measuring 156 beats per minute. The placenta is grade 1, located posteriorly. Maximum amn
iotic vertical pocket measures 3.6 cm.

 

Biophysical profile:

Fetal movement 2/2

Fetal tone 2/2.

Fetal breathing 2/2 

FANY 2/2

 

Total 8/8 

 

 

IMPRESSION:

 

1. Biophysical profile score of 8/8.

2. Single live intrauterine pregnancy in breech presentation with normal fetal heart rate of 156 bpm
.

3. Maximum amniotic vertical pocket measures 3.6 cm.

 

 

RPTAT: AAPP

_____________________________________________ 

Physician Sangeetha           Date    Time 

Electronically viewed and signed by Physician Sangeetha on 12/14/2017 14:15 

 

D:  12/14/2017 14:15  T:  12/14/2017 14:15

ENDY/

## 2017-12-14 NOTE — CONS
Date/Time of Note


Date/Time of Note


DATE: 17 


TIME: 19:47





Consultation Date/Type/Reason


Admit Date/Time


 2017


OB triage consult





This patient is a 21 years old, 3 para 2 ,who had 1 spontaneous vaginal 

delivery and 1  section,


 she came to triage complaining of contractions since yesterday.


On examination; the abdomen was soft, scattered contraction ,


 fetal heart tone was normal with fairly good variability, no decelerations


On general physical examination, her blood pressure was 102/64, pulse rate 101, 

respiration 18 temperature 98, 0.0,


Constitutional:  No chills, No diaphoresis, No disoriented, No febrile, No 

improved, No no complaints, No other, No poor po, No requiring IVF, No 

requiring O2


Eyes:  No discharge, No no complaints, No other, No pain, No redness, No visual 

change


ENT:  No bleeding, No congestion, No discharge, No dysphagia, No no complaints, 

No other, No pain, No sore throat


Respiratory:  No cough, No no complaints, No other, No pain, No pleuritic pain, 

No shortness of breath, No sputum, No wheezing


Cardiovascular:  No chest pain, No edema, No lightheadedness, No no complaints, 

No orthopenea, No other, No palpitations, No paroxysmal nocturnal dyspnea


Gastrointestinal:  No blood, No constipation, No decreased appetite, No diarrhea

, No flatus, No nausea, No no complaints, No other, No pain, No passing stool, 

No vomiting


Genitourinary:  other (Due to lack of contractions pelvic examination was not 

performed), 


   No bleeding, No discharge, No dysuria, No flank pain, No hematuria, No no 

complaints


Musculoskeletal:  No back pain, No bone/joint pain, No neck pain, No no 

complaints, No other, No restricted range of motion, No swelling


Skin:  No bruising, No erythema, No laceration, No no complaints, No other, No 

pruritis, No rash, No skin lesions


Neurologic:  No confusion, No dizziness, No focal-weakness, No headache, No no 

complaints, No other, No seizure, No syncope


Endocrine:  No dry skin, No no complaints, No other, No polydypsia, No polyuria

, No temp intolerance


Additional Comments


On OB ultrasound ; the study report was;a single live intrauterine pregnancy, 

in breech presentation with normal fetal heart rate of 156 bpm


placenta was grade 1 posterior location, and maximum amniotic vertical pocket 

measures 3.6 cm.


Her biophysical profile was 8/8.


These normal finding patient was discharged home to do the kick count and to be 

followed in her obstetrician's clinic





Exam/Review of Systems


Vital Signs


Vitals





 Vital Signs








  Date Time  Temp Pulse Resp B/P Pulse Ox O2 Delivery O2 Flow Rate FiO2


 


17 13:28 98.0 101 18 102/64  Room Air  

















ALESSANDRA RON MD Dec 14, 2017 19:53

## 2018-01-06 ENCOUNTER — HOSPITAL ENCOUNTER (OUTPATIENT)
Dept: HOSPITAL 91 - OBT | Age: 23
LOS: 1 days | Discharge: HOME | End: 2018-01-07
Payer: COMMERCIAL

## 2018-01-06 ENCOUNTER — HOSPITAL ENCOUNTER (OUTPATIENT)
Age: 23
LOS: 1 days | Discharge: HOME | End: 2018-01-07

## 2018-01-06 DIAGNOSIS — O36.8130: Primary | ICD-10-CM

## 2018-01-06 DIAGNOSIS — O26.893: ICD-10-CM

## 2018-01-06 DIAGNOSIS — Z3A.30: ICD-10-CM

## 2018-01-06 DIAGNOSIS — R10.2: ICD-10-CM

## 2018-01-06 PROCEDURE — 76818 FETAL BIOPHYS PROFILE W/NST: CPT

## 2018-01-06 PROCEDURE — 87086 URINE CULTURE/COLONY COUNT: CPT

## 2018-01-06 PROCEDURE — 84112 EVAL AMNIOTIC FLUID PROTEIN: CPT

## 2018-01-06 PROCEDURE — 81003 URINALYSIS AUTO W/O SCOPE: CPT

## 2018-01-06 PROCEDURE — 76817 TRANSVAGINAL US OBSTETRIC: CPT

## 2018-01-07 LAB
ADD UMIC: NO
RUPTURE FETAL MEMBRANES: NEGATIVE
UR ASCORBIC ACID: NEGATIVE MG/DL
UR BILIRUBIN (DIP): NEGATIVE MG/DL
UR BLOOD (DIP): NEGATIVE MG/DL
UR CLARITY: CLEAR
UR COLOR: YELLOW
UR GLUCOSE (DIP): NEGATIVE MG/DL
UR KETONES (DIP): NEGATIVE MG/DL
UR LEUKOCYTE ESTERASE (DIP): NEGATIVE LEU/UL
UR NITRITE (DIP): NEGATIVE MG/DL
UR PH (DIP): 5 (ref 5–9)
UR SPECIFIC GRAVITY (DIP): 1.02 (ref 1–1.03)
UR TOTAL PROTEIN (DIP): NEGATIVE MG/DL
UR UROBILINOGEN (DIP): NEGATIVE MG/DL

## 2018-01-28 ENCOUNTER — HOSPITAL ENCOUNTER (OUTPATIENT)
Dept: HOSPITAL 91 - OBT | Age: 23
Discharge: HOME | End: 2018-01-28
Payer: COMMERCIAL

## 2018-01-28 ENCOUNTER — HOSPITAL ENCOUNTER (OUTPATIENT)
Age: 23
Discharge: HOME | End: 2018-01-28

## 2018-01-28 DIAGNOSIS — Z3A.00: ICD-10-CM

## 2018-01-28 LAB
ADD UMIC: YES
UR ASCORBIC ACID: NEGATIVE MG/DL
UR BACTERIA: (no result) /HPF
UR BILIRUBIN (DIP): NEGATIVE MG/DL
UR BLOOD (DIP): NEGATIVE MG/DL
UR CLARITY: (no result)
UR COLOR: YELLOW
UR GLUCOSE (DIP): NEGATIVE MG/DL
UR KETONES (DIP): NEGATIVE MG/DL
UR LEUKOCYTE ESTERASE (DIP): (no result) LEU/UL
UR MUCUS: (no result) /HPF
UR NITRITE (DIP): NEGATIVE MG/DL
UR PH (DIP): 5 (ref 5–9)
UR RBC: 3 /HPF (ref 0–5)
UR SPECIFIC GRAVITY (DIP): 1.02 (ref 1–1.03)
UR SQUAMOUS EPITHELIAL CELL: (no result) /HPF
UR TOTAL PROTEIN (DIP): NEGATIVE MG/DL
UR UROBILINOGEN (DIP): NEGATIVE MG/DL
UR WBC: 5 /HPF (ref 0–5)

## 2018-01-28 PROCEDURE — 76818 FETAL BIOPHYS PROFILE W/NST: CPT

## 2018-01-28 PROCEDURE — 87086 URINE CULTURE/COLONY COUNT: CPT

## 2018-01-28 PROCEDURE — 80307 DRUG TEST PRSMV CHEM ANLYZR: CPT

## 2018-01-28 PROCEDURE — 81001 URINALYSIS AUTO W/SCOPE: CPT

## 2018-02-12 ENCOUNTER — HOSPITAL ENCOUNTER (INPATIENT)
Dept: HOSPITAL 91 - OBT | Age: 23
LOS: 4 days | Discharge: HOME | End: 2018-02-16
Payer: COMMERCIAL

## 2018-02-12 ENCOUNTER — HOSPITAL ENCOUNTER (INPATIENT)
Age: 23
LOS: 4 days | Discharge: HOME | End: 2018-02-16

## 2018-02-12 DIAGNOSIS — Z3A.37: ICD-10-CM

## 2018-02-12 DIAGNOSIS — O36.5930: Primary | ICD-10-CM

## 2018-02-12 DIAGNOSIS — O34.211: ICD-10-CM

## 2018-02-12 PROCEDURE — 80307 DRUG TEST PRSMV CHEM ANLYZR: CPT

## 2018-02-12 PROCEDURE — 62319: CPT

## 2018-02-12 PROCEDURE — 86900 BLOOD TYPING SEROLOGIC ABO: CPT

## 2018-02-12 PROCEDURE — 76815 OB US LIMITED FETUS(S): CPT

## 2018-02-12 PROCEDURE — 85730 THROMBOPLASTIN TIME PARTIAL: CPT

## 2018-02-12 PROCEDURE — 85025 COMPLETE CBC W/AUTO DIFF WBC: CPT

## 2018-02-12 PROCEDURE — 86901 BLOOD TYPING SEROLOGIC RH(D): CPT

## 2018-02-12 PROCEDURE — 85610 PROTHROMBIN TIME: CPT

## 2018-02-12 PROCEDURE — 86592 SYPHILIS TEST NON-TREP QUAL: CPT

## 2018-02-12 PROCEDURE — 76818 FETAL BIOPHYS PROFILE W/NST: CPT

## 2018-02-12 PROCEDURE — 86850 RBC ANTIBODY SCREEN: CPT

## 2018-02-12 RX ADMIN — PYRIDOXINE HYDROCHLORIDE 1 MLS/HR: 100 INJECTION, SOLUTION INTRAMUSCULAR; INTRAVENOUS at 22:43

## 2018-02-13 LAB
ADD MAN DIFF?: NO
BASOPHIL #: 0 10^3/UL (ref 0–0.1)
BASOPHILS %: 0.3 % (ref 0–2)
EOSINOPHILS #: 0.1 10^3/UL (ref 0–0.5)
EOSINOPHILS %: 0.5 % (ref 0–7)
HEMATOCRIT: 34.4 % (ref 37–47)
HEMOGLOBIN: 11.2 G/DL (ref 12–16)
INR: 0.86
LYMPHOCYTES #: 3.1 10^3/UL (ref 0.8–2.9)
LYMPHOCYTES %: 28 % (ref 15–51)
MEAN CORPUSCULAR HEMOGLOBIN: 27.7 PG (ref 29–33)
MEAN CORPUSCULAR HGB CONC: 32.6 G/DL (ref 32–37)
MEAN CORPUSCULAR VOLUME: 84.9 FL (ref 82–101)
MEAN PLATELET VOLUME: 10.8 FL (ref 7.4–10.4)
MONOCYTE #: 0.4 10^3/UL (ref 0.3–0.9)
MONOCYTES %: 3.7 % (ref 0–11)
NEUTROPHIL #: 7.5 10^3/UL (ref 1.6–7.5)
NEUTROPHILS %: 67.2 % (ref 39–77)
NUCLEATED RED BLOOD CELLS #: 0 10^3/UL (ref 0–0)
NUCLEATED RED BLOOD CELLS%: 0 /100WBC (ref 0–0)
PARTIAL THROMBOPLASTIN TIME: 28.6 SEC (ref 25–35)
PLATELET COUNT: 351 10^3/UL (ref 140–415)
PROTIME: 11.8 SEC (ref 11.9–14.9)
PT RATIO: 0.9
RAPID PLASMA REAGIN: NONREACTIVE
RED BLOOD COUNT: 4.05 10^6/UL (ref 4.2–5.4)
RED CELL DISTRIBUTION WIDTH: 12.9 % (ref 11.5–14.5)
WHITE BLOOD COUNT: 11.2 10^3/UL (ref 4.8–10.8)

## 2018-02-13 RX ADMIN — CEFAZOLIN SODIUM 1 MLS/HR: 2 SOLUTION INTRAVENOUS at 11:28

## 2018-02-13 RX ADMIN — CEFAZOLIN SODIUM 1 MLS/HR: 2 SOLUTION INTRAVENOUS at 21:50

## 2018-02-13 RX ADMIN — PYRIDOXINE HYDROCHLORIDE 1 MLS/HR: 100 INJECTION, SOLUTION INTRAMUSCULAR; INTRAVENOUS at 23:40

## 2018-02-13 RX ADMIN — PYRIDOXINE HYDROCHLORIDE 1 MLS/HR: 100 INJECTION, SOLUTION INTRAMUSCULAR; INTRAVENOUS at 05:47

## 2018-02-13 RX ADMIN — Medication 7 APPLIC: at 17:41

## 2018-02-13 RX ADMIN — KETOROLAC TROMETHAMINE 1 MG: 30 INJECTION, SOLUTION INTRAMUSCULAR at 17:40

## 2018-02-13 RX ADMIN — PYRIDOXINE HYDROCHLORIDE 1 MLS/HR: 100 INJECTION, SOLUTION INTRAMUSCULAR; INTRAVENOUS at 01:22

## 2018-02-13 RX ADMIN — DOCUSATE SODIUM AND SENNOSIDES 1 TAB: 8.6; 5 TABLET, FILM COATED ORAL at 21:49

## 2018-02-13 RX ADMIN — Medication 1 MLS/HR: at 17:41

## 2018-02-13 RX ADMIN — CEFAZOLIN SODIUM 1 MLS/HR: 2 SOLUTION INTRAVENOUS at 13:32

## 2018-02-13 RX ADMIN — Medication 1 MLS/HR: at 13:31

## 2018-02-14 LAB
ADD MAN DIFF?: NO
BASOPHIL #: 0 10^3/UL (ref 0–0.1)
BASOPHILS %: 0.2 % (ref 0–2)
EOSINOPHILS #: 0.1 10^3/UL (ref 0–0.5)
EOSINOPHILS %: 0.5 % (ref 0–7)
HEMATOCRIT: 27.1 % (ref 37–47)
HEMOGLOBIN: 8.9 G/DL (ref 12–16)
LYMPHOCYTES #: 2.1 10^3/UL (ref 0.8–2.9)
LYMPHOCYTES %: 20.2 % (ref 15–51)
MEAN CORPUSCULAR HEMOGLOBIN: 27.4 PG (ref 29–33)
MEAN CORPUSCULAR HGB CONC: 32.8 G/DL (ref 32–37)
MEAN CORPUSCULAR VOLUME: 83.4 FL (ref 82–101)
MEAN PLATELET VOLUME: 10.4 FL (ref 7.4–10.4)
MONOCYTE #: 0.7 10^3/UL (ref 0.3–0.9)
MONOCYTES %: 6.3 % (ref 0–11)
NEUTROPHIL #: 7.5 10^3/UL (ref 1.6–7.5)
NEUTROPHILS %: 72.4 % (ref 39–77)
NUCLEATED RED BLOOD CELLS #: 0 10^3/UL (ref 0–0)
NUCLEATED RED BLOOD CELLS%: 0 /100WBC (ref 0–0)
PLATELET COUNT: 266 10^3/UL (ref 140–415)
RED BLOOD COUNT: 3.25 10^6/UL (ref 4.2–5.4)
RED CELL DISTRIBUTION WIDTH: 12.8 % (ref 11.5–14.5)
WHITE BLOOD COUNT: 10.3 10^3/UL (ref 4.8–10.8)

## 2018-02-14 RX ADMIN — FERROUS SULFATE TAB 325 MG (65 MG ELEMENTAL FE) 1 MG: 325 (65 FE) TAB at 21:34

## 2018-02-14 RX ADMIN — DOCUSATE SODIUM AND SENNOSIDES 1 TAB: 8.6; 5 TABLET, FILM COATED ORAL at 09:15

## 2018-02-14 RX ADMIN — CEFAZOLIN SODIUM 1 MLS/HR: 2 SOLUTION INTRAVENOUS at 05:33

## 2018-02-14 RX ADMIN — IBUPROFEN 1 MG: 600 TABLET ORAL at 17:35

## 2018-02-14 RX ADMIN — PYRIDOXINE HYDROCHLORIDE 1 MLS/HR: 100 INJECTION, SOLUTION INTRAMUSCULAR; INTRAVENOUS at 08:10

## 2018-02-14 RX ADMIN — DOCUSATE SODIUM AND SENNOSIDES 1 TAB: 8.6; 5 TABLET, FILM COATED ORAL at 21:34

## 2018-02-14 RX ADMIN — KETOROLAC TROMETHAMINE 1 MG: 30 INJECTION, SOLUTION INTRAMUSCULAR at 11:28

## 2018-02-14 RX ADMIN — OXYCODONE HYDROCHLORIDE AND ACETAMINOPHEN 1 TAB: 5; 325 TABLET ORAL at 15:32

## 2018-02-14 RX ADMIN — KETOROLAC TROMETHAMINE 1 MG: 30 INJECTION, SOLUTION INTRAMUSCULAR at 04:40

## 2018-02-15 LAB
ADD MAN DIFF?: NO
BASOPHIL #: 0 10^3/UL (ref 0–0.1)
BASOPHILS %: 0.2 % (ref 0–2)
EOSINOPHILS #: 0.1 10^3/UL (ref 0–0.5)
EOSINOPHILS %: 1.2 % (ref 0–7)
HEMATOCRIT: 28.3 % (ref 37–47)
HEMOGLOBIN: 9.2 G/DL (ref 12–16)
LYMPHOCYTES #: 2.8 10^3/UL (ref 0.8–2.9)
LYMPHOCYTES %: 26.5 % (ref 15–51)
MEAN CORPUSCULAR HEMOGLOBIN: 27.3 PG (ref 29–33)
MEAN CORPUSCULAR HGB CONC: 32.5 G/DL (ref 32–37)
MEAN CORPUSCULAR VOLUME: 84 FL (ref 82–101)
MEAN PLATELET VOLUME: 10.3 FL (ref 7.4–10.4)
MONOCYTE #: 0.8 10^3/UL (ref 0.3–0.9)
MONOCYTES %: 7.6 % (ref 0–11)
NEUTROPHIL #: 6.7 10^3/UL (ref 1.6–7.5)
NEUTROPHILS %: 64.1 % (ref 39–77)
NUCLEATED RED BLOOD CELLS #: 0 10^3/UL (ref 0–0)
NUCLEATED RED BLOOD CELLS%: 0 /100WBC (ref 0–0)
PLATELET COUNT: 285 10^3/UL (ref 140–415)
RED BLOOD COUNT: 3.37 10^6/UL (ref 4.2–5.4)
RED CELL DISTRIBUTION WIDTH: 13.3 % (ref 11.5–14.5)
WHITE BLOOD COUNT: 10.5 10^3/UL (ref 4.8–10.8)

## 2018-02-15 RX ADMIN — IBUPROFEN 1 MG: 600 TABLET ORAL at 00:08

## 2018-02-15 RX ADMIN — Medication 1 APPLIC: at 22:39

## 2018-02-15 RX ADMIN — FERROUS SULFATE TAB 325 MG (65 MG ELEMENTAL FE) 1 MG: 325 (65 FE) TAB at 22:30

## 2018-02-15 RX ADMIN — DOCUSATE SODIUM AND SENNOSIDES 1 TAB: 8.6; 5 TABLET, FILM COATED ORAL at 22:30

## 2018-02-15 RX ADMIN — IBUPROFEN 1 MG: 600 TABLET ORAL at 17:47

## 2018-02-15 RX ADMIN — DOCUSATE SODIUM AND SENNOSIDES 1 TAB: 8.6; 5 TABLET, FILM COATED ORAL at 08:58

## 2018-02-15 RX ADMIN — IBUPROFEN 1 MG: 600 TABLET ORAL at 06:24

## 2018-02-15 RX ADMIN — FERROUS SULFATE TAB 325 MG (65 MG ELEMENTAL FE) 1 MG: 325 (65 FE) TAB at 08:58

## 2018-02-15 RX ADMIN — IBUPROFEN 1 MG: 600 TABLET ORAL at 22:33

## 2018-02-15 RX ADMIN — IBUPROFEN 1 MG: 600 TABLET ORAL at 11:44

## 2018-02-16 RX ADMIN — OXYCODONE HYDROCHLORIDE AND ACETAMINOPHEN 1 TAB: 5; 325 TABLET ORAL at 03:07

## 2018-02-16 RX ADMIN — FERROUS SULFATE TAB 325 MG (65 MG ELEMENTAL FE) 1 MG: 325 (65 FE) TAB at 09:29

## 2018-02-16 RX ADMIN — IBUPROFEN 1 MG: 600 TABLET ORAL at 06:07

## 2018-02-16 RX ADMIN — OXYCODONE HYDROCHLORIDE AND ACETAMINOPHEN 1 TAB: 5; 325 TABLET ORAL at 09:29

## 2018-02-16 RX ADMIN — IBUPROFEN 1 MG: 600 TABLET ORAL at 12:49

## 2018-02-16 RX ADMIN — DOCUSATE SODIUM AND SENNOSIDES 1 TAB: 8.6; 5 TABLET, FILM COATED ORAL at 09:29
